# Patient Record
Sex: MALE | Race: WHITE | NOT HISPANIC OR LATINO | Employment: FULL TIME | RURAL
[De-identification: names, ages, dates, MRNs, and addresses within clinical notes are randomized per-mention and may not be internally consistent; named-entity substitution may affect disease eponyms.]

---

## 2023-01-26 ENCOUNTER — OFFICE VISIT (OUTPATIENT)
Dept: FAMILY MEDICINE CLINIC | Facility: CLINIC | Age: 63
End: 2023-01-26
Payer: COMMERCIAL

## 2023-01-26 VITALS
HEIGHT: 71 IN | BODY MASS INDEX: 31.22 KG/M2 | SYSTOLIC BLOOD PRESSURE: 132 MMHG | DIASTOLIC BLOOD PRESSURE: 74 MMHG | HEART RATE: 68 BPM | OXYGEN SATURATION: 98 % | WEIGHT: 223 LBS

## 2023-01-26 DIAGNOSIS — L72.3 SEBACEOUS CYST: Primary | ICD-10-CM

## 2023-01-26 PROCEDURE — 99203 OFFICE O/P NEW LOW 30 MIN: CPT | Performed by: FAMILY MEDICINE

## 2023-01-26 RX ORDER — MINOCYCLINE HYDROCHLORIDE 100 MG/1
100 CAPSULE ORAL 2 TIMES DAILY
Qty: 28 CAPSULE | Refills: 0 | Status: SHIPPED | OUTPATIENT
Start: 2023-01-26

## 2023-01-26 NOTE — PROGRESS NOTES
"Subjective   Sherrie Lima is a 62 y.o. male.     Chief Complaint   Patient presents with   • Establish Care   • Insect Bite     Right shoulder blade        History of Present Illness     sherrie notes a large painful erythematous mass on his right upper back--prsent for several days--denies fever      Current Outpatient Medications:   •  minocycline (Minocin) 100 MG capsule, Take 1 capsule by mouth 2 (Two) Times a Day., Disp: 28 capsule, Rfl: 0  No Known Allergies    BMI is >= 30 and <35. (Class 1 Obesity). The following options were offered after discussion;: nutrition counseling/recommendations      No past medical history on file.  No past surgical history on file.    Review of Systems   Constitutional: Negative.    HENT: Negative.    Eyes: Negative.    Respiratory: Negative.    Cardiovascular: Negative.    Gastrointestinal: Negative.    Endocrine: Negative.    Genitourinary: Negative.    Musculoskeletal: Negative.    Skin: Positive for color change and wound.   Allergic/Immunologic: Negative.    Neurological: Negative.    Hematological: Negative.    Psychiatric/Behavioral: Negative.        Objective  /74   Pulse 68   Ht 180.3 cm (71\")   Wt 101 kg (223 lb)   SpO2 98%   BMI 31.10 kg/m²   Physical Exam  Vitals and nursing note reviewed.   Constitutional:       Appearance: Normal appearance.   HENT:      Head: Normocephalic and atraumatic.      Nose: Nose normal.      Mouth/Throat:      Mouth: Mucous membranes are moist.   Eyes:      Extraocular Movements: Extraocular movements intact.      Conjunctiva/sclera: Conjunctivae normal.      Pupils: Pupils are equal, round, and reactive to light.   Cardiovascular:      Rate and Rhythm: Normal rate and regular rhythm.      Pulses: Normal pulses.      Heart sounds: Normal heart sounds.   Pulmonary:      Effort: Pulmonary effort is normal.   Abdominal:      General: Abdomen is flat. Bowel sounds are normal.      Palpations: Abdomen is soft.   Musculoskeletal:    "      General: Normal range of motion.      Cervical back: Normal range of motion.   Skin:     Capillary Refill: Capillary refill takes less than 2 seconds.      Findings: Erythema and lesion present.   Neurological:      General: No focal deficit present.      Mental Status: He is alert and oriented to person, place, and time. Mental status is at baseline.   Psychiatric:         Mood and Affect: Mood normal.     exam does confirm 4cm by 5cm erythematous mass on the right upper back with central pustule.    Assessment & Plan   Diagnoses and all orders for this visit:    1. Sebaceous cyst (Primary)  -     Ambulatory Referral to General Surgery    Other orders  -     minocycline (Minocin) 100 MG capsule; Take 1 capsule by mouth 2 (Two) Times a Day.  Dispense: 28 capsule; Refill: 0                 Orders Placed This Encounter   Procedures   • Ambulatory Referral to General Surgery     Referral Priority:   Routine     Referral Type:   Consultation     Referral Reason:   Specialty Services Required     Referred to Provider:   Dixon Allen MD     Requested Specialty:   General Surgery     Number of Visits Requested:   1       Follow up: 4 month(s)

## 2023-02-01 ENCOUNTER — HOSPITAL ENCOUNTER (EMERGENCY)
Facility: HOSPITAL | Age: 63
Discharge: HOME | End: 2023-02-01
Attending: STUDENT IN AN ORGANIZED HEALTH CARE EDUCATION/TRAINING PROGRAM | Admitting: STUDENT IN AN ORGANIZED HEALTH CARE EDUCATION/TRAINING PROGRAM
Payer: COMMERCIAL

## 2023-02-01 VITALS
TEMPERATURE: 98.5 F | BODY MASS INDEX: 30.52 KG/M2 | RESPIRATION RATE: 18 BRPM | SYSTOLIC BLOOD PRESSURE: 138 MMHG | WEIGHT: 218 LBS | DIASTOLIC BLOOD PRESSURE: 87 MMHG | OXYGEN SATURATION: 96 % | HEIGHT: 71 IN | HEART RATE: 77 BPM

## 2023-02-01 DIAGNOSIS — L02.212 ABSCESS OF BACK: Primary | ICD-10-CM

## 2023-02-01 DIAGNOSIS — L03.312 CELLULITIS OF BACK EXCEPT BUTTOCK: ICD-10-CM

## 2023-02-01 LAB
ANION GAP SERPL CALCULATED.3IONS-SCNC: 12 MMOL/L (ref 5–15)
BASOPHILS # BLD AUTO: 0.06 10*3/MM3 (ref 0–0.2)
BASOPHILS NFR BLD AUTO: 0.4 % (ref 0–1.5)
BUN SERPL-MCNC: 10 MG/DL (ref 8–23)
BUN/CREAT SERPL: 10 (ref 7–25)
CALCIUM SPEC-SCNC: 8.7 MG/DL (ref 8.6–10.5)
CHLORIDE SERPL-SCNC: 96 MMOL/L (ref 98–107)
CO2 SERPL-SCNC: 28 MMOL/L (ref 22–29)
CREAT SERPL-MCNC: 1 MG/DL (ref 0.76–1.27)
CRP SERPL-MCNC: 11.89 MG/DL (ref 0–0.5)
DEPRECATED RDW RBC AUTO: 42.7 FL (ref 37–54)
EGFRCR SERPLBLD CKD-EPI 2021: 85.1 ML/MIN/1.73
EOSINOPHIL # BLD AUTO: 0.08 10*3/MM3 (ref 0–0.4)
EOSINOPHIL NFR BLD AUTO: 0.6 % (ref 0.3–6.2)
ERYTHROCYTE [DISTWIDTH] IN BLOOD BY AUTOMATED COUNT: 12.6 % (ref 12.3–15.4)
ERYTHROCYTE [SEDIMENTATION RATE] IN BLOOD: 30 MM/HR (ref 0–20)
GLUCOSE SERPL-MCNC: 130 MG/DL (ref 65–99)
HCT VFR BLD AUTO: 46.5 % (ref 37.5–51)
HGB BLD-MCNC: 15.1 G/DL (ref 13–17.7)
IMM GRANULOCYTES # BLD AUTO: 0.07 10*3/MM3 (ref 0–0.05)
IMM GRANULOCYTES NFR BLD AUTO: 0.5 % (ref 0–0.5)
LYMPHOCYTES # BLD AUTO: 2.01 10*3/MM3 (ref 0.7–3.1)
LYMPHOCYTES NFR BLD AUTO: 14.2 % (ref 19.6–45.3)
MCH RBC QN AUTO: 30 PG (ref 26.6–33)
MCHC RBC AUTO-ENTMCNC: 32.5 G/DL (ref 31.5–35.7)
MCV RBC AUTO: 92.3 FL (ref 79–97)
MONOCYTES # BLD AUTO: 1.32 10*3/MM3 (ref 0.1–0.9)
MONOCYTES NFR BLD AUTO: 9.3 % (ref 5–12)
NEUTROPHILS NFR BLD AUTO: 10.59 10*3/MM3 (ref 1.7–7)
NEUTROPHILS NFR BLD AUTO: 75 % (ref 42.7–76)
NRBC BLD AUTO-RTO: 0 /100 WBC (ref 0–0.2)
PLATELET # BLD AUTO: 342 10*3/MM3 (ref 140–450)
PMV BLD AUTO: 9.9 FL (ref 6–12)
POTASSIUM SERPL-SCNC: 4 MMOL/L (ref 3.5–5.2)
RBC # BLD AUTO: 5.04 10*6/MM3 (ref 4.14–5.8)
SODIUM SERPL-SCNC: 136 MMOL/L (ref 136–145)
WBC NRBC COR # BLD: 14.13 10*3/MM3 (ref 3.4–10.8)

## 2023-02-01 PROCEDURE — 85025 COMPLETE CBC W/AUTO DIFF WBC: CPT | Performed by: STUDENT IN AN ORGANIZED HEALTH CARE EDUCATION/TRAINING PROGRAM

## 2023-02-01 PROCEDURE — 86140 C-REACTIVE PROTEIN: CPT | Performed by: STUDENT IN AN ORGANIZED HEALTH CARE EDUCATION/TRAINING PROGRAM

## 2023-02-01 PROCEDURE — 99283 EMERGENCY DEPT VISIT LOW MDM: CPT

## 2023-02-01 PROCEDURE — 85652 RBC SED RATE AUTOMATED: CPT | Performed by: STUDENT IN AN ORGANIZED HEALTH CARE EDUCATION/TRAINING PROGRAM

## 2023-02-01 PROCEDURE — 80048 BASIC METABOLIC PNL TOTAL CA: CPT | Performed by: STUDENT IN AN ORGANIZED HEALTH CARE EDUCATION/TRAINING PROGRAM

## 2023-02-01 RX ORDER — LIDOCAINE HYDROCHLORIDE AND EPINEPHRINE BITARTRATE 20; .01 MG/ML; MG/ML
10 INJECTION, SOLUTION SUBCUTANEOUS ONCE
Status: COMPLETED | OUTPATIENT
Start: 2023-02-01 | End: 2023-02-01

## 2023-02-01 RX ORDER — CEPHALEXIN 500 MG/1
500 CAPSULE ORAL ONCE
Status: COMPLETED | OUTPATIENT
Start: 2023-02-01 | End: 2023-02-01

## 2023-02-01 RX ORDER — OXYCODONE HYDROCHLORIDE 5 MG/1
5 TABLET ORAL ONCE
Status: DISCONTINUED | OUTPATIENT
Start: 2023-02-01 | End: 2023-02-01 | Stop reason: HOSPADM

## 2023-02-01 RX ORDER — ONDANSETRON 4 MG/1
4 TABLET, ORALLY DISINTEGRATING ORAL ONCE
Status: DISCONTINUED | OUTPATIENT
Start: 2023-02-01 | End: 2023-02-01 | Stop reason: HOSPADM

## 2023-02-01 RX ORDER — ACETAMINOPHEN 500 MG
1000 TABLET ORAL ONCE
Status: DISCONTINUED | OUTPATIENT
Start: 2023-02-01 | End: 2023-02-01 | Stop reason: HOSPADM

## 2023-02-01 RX ORDER — IBUPROFEN 400 MG/1
600 TABLET ORAL ONCE
Status: COMPLETED | OUTPATIENT
Start: 2023-02-01 | End: 2023-02-01

## 2023-02-01 RX ORDER — SULFAMETHOXAZOLE AND TRIMETHOPRIM 800; 160 MG/1; MG/1
1 TABLET ORAL 2 TIMES DAILY
Qty: 20 TABLET | Refills: 0 | Status: SHIPPED | OUTPATIENT
Start: 2023-02-01 | End: 2023-02-11

## 2023-02-01 RX ORDER — SULFAMETHOXAZOLE AND TRIMETHOPRIM 800; 160 MG/1; MG/1
1 TABLET ORAL ONCE
Status: COMPLETED | OUTPATIENT
Start: 2023-02-01 | End: 2023-02-01

## 2023-02-01 RX ORDER — CEPHALEXIN 500 MG/1
500 CAPSULE ORAL 3 TIMES DAILY
Qty: 30 CAPSULE | Refills: 0 | Status: SHIPPED | OUTPATIENT
Start: 2023-02-01 | End: 2023-02-11

## 2023-02-01 RX ORDER — SODIUM CHLORIDE 0.9 % (FLUSH) 0.9 %
10 SYRINGE (ML) INJECTION AS NEEDED
Status: DISCONTINUED | OUTPATIENT
Start: 2023-02-01 | End: 2023-02-01 | Stop reason: HOSPADM

## 2023-02-01 RX ADMIN — IBUPROFEN 600 MG: 400 TABLET, FILM COATED ORAL at 20:38

## 2023-02-01 RX ADMIN — CEPHALEXIN 500 MG: 500 CAPSULE ORAL at 19:45

## 2023-02-01 RX ADMIN — LIDOCAINE HYDROCHLORIDE,EPINEPHRINE BITARTRATE 10 ML: 20; .01 INJECTION, SOLUTION INFILTRATION; PERINEURAL at 19:46

## 2023-02-01 RX ADMIN — SULFAMETHOXAZOLE AND TRIMETHOPRIM 1 TABLET: 800; 160 TABLET ORAL at 19:46

## 2023-02-02 NOTE — ED PROCEDURE NOTE
"Place of Service:[facility] EMERGENCY DEPARTMENT  Patient Name:Sherrie Lmia  :1960    Procedure  Incision & Drainage    Date/Time: 2023 10:42 PM  Performed by: Rui Barber MD  Authorized by: Rui Barber MD     Consent:     Consent obtained:  Verbal    Consent given by:  Patient and spouse    Risks discussed:  Bleeding, damage to other organs, infection, incomplete drainage and pain    Alternatives discussed:  Referral  Universal protocol:     Imaging studies available: yes (bedside US)      Patient identity confirmed:  Verbally with patient  Location:     Type:  Abscess    Location:  Trunk    Trunk location:  Back  Pre-procedure details:     Skin preparation:  Chlorhexidine with alcohol  Anesthesia:     Anesthesia method:  Local infiltration    Local anesthetic:  Lidocaine 1% WITH epi  Procedure type:     Complexity:  Simple  Procedure details:     Ultrasound guidance: yes      Needle aspiration: no      Incision types:  Stab incision    Incision depth:  Subcutaneous    Wound management:  Probed and deloculated, irrigated with saline and extensive cleaning    Drainage:  Bloody and purulent    Drainage amount:  Copious    Wound treatment:  Drain placed    Packing materials:  1/2 in iodoform gauze    Amount 1/2\" iodoform:  About 3 inches  Post-procedure details:     Procedure completion:  Tolerated well, no immediate complications              Rui Barber MD  [id number]    "

## 2023-02-02 NOTE — ED PROVIDER NOTES
"EMERGENCY DEPARTMENT ATTENDING NOTE    Patient Name: Sherrie Lima    Chief Complaint   Patient presents with   • Abscess       PATIENT PRESENTATION:  Sherrie Lima is a very pleasant 62 y.o. 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    History is obtained with the patient as well as corroborated by his wife is at the bedside.  About 1 week ago patient noticed some redness and swelling to his right upper back no obvious inciting incident.  States he saw his primary care provider who gave him a prescription for doxycycline for 2 weeks thought it was but possibly a abscess cyst and gave him follow-up with general surgery but his follow-up was not scheduled for about a week.  Patient denies any current fevers any chills.  States it has become more tender to touch and more raised and has had some drainage from it.  Not on any immunosuppressive drugs per his report.  Denies any associate chest pain or shortness of breath.  No associated midline back pain.  No numbness or weakness or tingling in his arms or legs.    PHYSICAL EXAM:   VS: /87 (BP Location: Left arm, Patient Position: Sitting)   Pulse 77   Temp 98.5 °F (36.9 °C) (Oral)   Resp 18   Ht 180.3 cm (71\")   Wt 98.9 kg (218 lb)   SpO2 96%   BMI 30.40 kg/m²   GENERAL: Well-appearing middle-aged man sitting up in stretcher no acute distress well-nourished, well-developed, awake, alert, no acute distress, nontoxic appearing, comfortable  EYES: PERRL, sclerae anicteric, extraocular movements grossly intact, symmetric lids  EARS, NOSE, MOUTH, THROAT: atraumatic external nose and ears, moist mucous membranes  NECK: Symmetric, trachea midline, no thyromegaly, no adenopathy, no meningismus  RESPIRATORY: Unlabored respiratory effort, clear to auscultation bilaterally, good air movement  CARDIOVASCULAR: No murmurs or gallops, peripheral pulses 2+ and equal in all " extremities  GI: Soft, nontender, nondistended, bowel sounds present, no hepatosplenomegaly  LYMPHATIC: no lymphadenopathy  MUSCULOSKELETAL/EXTREMITIES: Extremities without obvious deformity, no cyanosis or clubbing  SKIN: Large subcutaneous abscess with surrounding erythema consistent with cellulitis in the photo below the patient's right upper back; otherwise skin is warm and dry with no obvious rashes    NEUROLOGIC: moving all 4 extremities symmetrically, CN II-XII grossly intact  PSYCHIATRIC: alert, pleasant and cooperative. Appropriate mood and affect.      MEDICAL DECISION MAKING:    Sherrie Lima is a 62 y.o. male who presents emerged part with increased swelling after previous diagnosis of likely abscess.    Differential Diagnosis Considered: Subcutaneous abscess, sebaceous cyst, cellulitis    Labs Ordered:  Labs Reviewed   BASIC METABOLIC PANEL - Abnormal; Notable for the following components:       Result Value    Glucose 130 (*)     Chloride 96 (*)     All other components within normal limits    Narrative:     GFR Normal >60  Chronic Kidney Disease <60  Kidney Failure <15     SEDIMENTATION RATE - Abnormal; Notable for the following components:    Sed Rate 30 (*)     All other components within normal limits   C-REACTIVE PROTEIN - Abnormal; Notable for the following components:    C-Reactive Protein 11.89 (*)     All other components within normal limits   CBC WITH AUTO DIFFERENTIAL - Abnormal; Notable for the following components:    WBC 14.13 (*)     Lymphocyte % 14.2 (*)     Neutrophils, Absolute 10.59 (*)     Monocytes, Absolute 1.32 (*)     Immature Grans, Absolute 0.07 (*)     All other components within normal limits   CBC AND DIFFERENTIAL    Narrative:     The following orders were created for panel order CBC & Differential.  Procedure                               Abnormality         Status                     ---------                               -----------         ------                      CBC Auto Differential[[id number]]         Abnormal            Final result                 Please view results for these tests on the individual orders.        Imaging Ordered: Bedside point care ultrasound performed by me with large subcutaneous abscess seen in the photo below        My lab interpretation: Elevated white blood second of 40.13 elevated ESR to 30 and elevated CRP 11.    ED Course and Re-evaluation: 63yo M presents emerged apartment with increasing size of suspected abscess after previously being seen by his primary care provider.  Purulent drainage on exam.  Bedside ultrasound consistent with subcutaneous abscess.  No systemic symptoms or signs of infection patient afebrile normal vital signs.  Foreign point-of-care ultrasound number for abscess performed a bedside incision drainage with copious amounts of purulent fluid and bloody fluid consistent with abscess.  Patient reporting significant provement symptoms.  His labs are elevated at the setting of cellulitis and abscess but no concerns for systemic infection at this time.  Patient given 1 dose of Bactrim and Keflex in the emergency department and discharged with a 10-day prescription of both.  Plan is for PCP follow-up for wound check.  Packing was placed given large size of abscess.  Patient already has scheduled follow-up with general surgery so was instructed to continue to follow-up as he might need the capsule of the abscess to be ultimately removed.  Given return precautions to the emergency department for worsening symptoms.              ED Diagnosis:  Abscess of back; Cellulitis of back except buttock    Disposition: to home  Follow up plan: PCP follow up within 2 days, general surgery is scheduled next week, return to ED immediately if symptoms worsen          Signed:  Rui Barber MD  Emergency Medicine Physician    Please note that portions of this note were completed with a voice recognition program.      Rui Barber,  MD  02/02/23 0203

## 2023-02-02 NOTE — DISCHARGE INSTRUCTIONS
Today you are seen for symptoms due to a large abscess which we performed incision and drainage.  As we discussed we placed packing please leave in place it falls out this is okay.  I have switched your antibiotic regimen please take the prescribed antibiotics over the next 10 days.  I want you to follow-up with your primary care provider for a wound check but please go to your scheduled general surgery appointment next week as you will possibly need a cyst removal from the wall of the abscess although we had good drainage at the bedside.  If he develop worsening symptoms really high fevers or worsening pain please immediate return to the emergency department.

## 2023-02-07 ENCOUNTER — OFFICE VISIT (OUTPATIENT)
Dept: SURGERY | Facility: CLINIC | Age: 63
End: 2023-02-07
Payer: COMMERCIAL

## 2023-02-07 VITALS
BODY MASS INDEX: 30.52 KG/M2 | OXYGEN SATURATION: 98 % | TEMPERATURE: 97.5 F | SYSTOLIC BLOOD PRESSURE: 154 MMHG | HEIGHT: 71 IN | WEIGHT: 218 LBS | DIASTOLIC BLOOD PRESSURE: 92 MMHG | HEART RATE: 75 BPM

## 2023-02-07 DIAGNOSIS — L72.3 SEBACEOUS CYST: Primary | ICD-10-CM

## 2023-02-07 PROBLEM — L02.212 CUTANEOUS ABSCESS OF BACK EXCLUDING BUTTOCKS: Status: ACTIVE | Noted: 2023-02-07

## 2023-02-07 PROCEDURE — 99203 OFFICE O/P NEW LOW 30 MIN: CPT | Performed by: SPECIALIST

## 2023-02-07 RX ORDER — SODIUM HYPOCHLORITE 2.5 MG/ML
SOLUTION TOPICAL ONCE
Qty: 1000 ML | Refills: 1 | Status: SHIPPED | OUTPATIENT
Start: 2023-02-07 | End: 2023-02-07

## 2023-02-07 NOTE — PATIENT INSTRUCTIONS
Good to see you today Mr. Lima and glad you are feeling better, the abscess cavity looks like it is well treated I am proud how the emergency room treated you, we will continue the dressing changes with Dakin's moistened 4 x 4's daily I will see you back in 2 weeks for repeat evaluation begin the Dakin's moistened changes on Thursday keep the iodoform gauze in there until Thursday.  Follow-up with me in 2 weeks.

## 2023-02-07 NOTE — PROGRESS NOTES
Patient: Sherrie Lima    YOB: 1960    Date: 02/07/2023    Primary Care Provider: Rome Salinas MD    Chief Complaint   Patient presents with   • Cyst     Mr. Lima is here for a consult for a cyst/abscess on his right shoulder.       Subjective .     History of present illness:  Mr. Lima is a 62 y.o. who is here for evaluation of increasingly symptomatic abscess in his right posterior shoulder.  He is noted cyst to be present for some time, and recently he has noted some enlargement of this he was seen and evaluated by Dr. Salinas with it becoming grossly enlarged and subsequently referred to the emergency room for treatment, in the emergency room it was purulent, sizable, with a cyst that was at least 6 x 6 cm in size the area was drained in the emergency room and packed with iodoform gauze.  He is here for follow-up examination.    Past surgical history ventral hernia, also cholecystectomy.    Current medications are Bactrim and also Keflex  Allergies negative    He smokes 1 pack/day for 40 years, occasional alcohol    Review of systems positive for reading glasses, hearing loss, morning cough, a.m. urination, history of skin rash.      The following portions of the patient's history were reviewed and updated as appropriate: allergies, current medications, past family history, past medical history, past social history, past surgical history and problem list.    Review of Systems    History:  History reviewed. No pertinent past medical history.       Past Surgical History:   Procedure Laterality Date   • GALLBLADDER SURGERY     • KNEE SURGERY     • UMBILICAL HERNIA REPAIR         History reviewed. No pertinent family history.    Social History     Tobacco Use   • Smoking status: Every Day     Packs/day: 1.00     Years: 40.00     Pack years: 40.00     Types: Cigarettes   Substance Use Topics   • Drug use: Never       Allergies:  No Known Allergies    Medications:     Current Outpatient  "Medications:   •  cephalexin (KEFLEX) 500 MG capsule, Take 1 capsule by mouth 3 (Three) Times a Day for 10 days., Disp: 30 capsule, Rfl: 0  •  minocycline (Minocin) 100 MG capsule, Take 1 capsule by mouth 2 (Two) Times a Day., Disp: 28 capsule, Rfl: 0  •  sulfamethoxazole-trimethoprim (BACTRIM DS,SEPTRA DS) 800-160 MG per tablet, Take 1 tablet by mouth 2 (Two) Times a Day for 10 days., Disp: 20 tablet, Rfl: 0  •  sodium hypochlorite (DAKIN'S) 0.25 % topical solution, Apply  topically to the appropriate area as directed 1 (One) Time for 1 dose. Dakin's moistened 4 x 4's to pack in the open wound in his upper back daily, Disp: 1000 mL, Rfl: 1    Objective     Vital Signs:   Vitals:    02/07/23 1405   BP: 154/92   BP Location: Left arm   Patient Position: Sitting   Cuff Size: Adult   Pulse: 75   Temp: 97.5 °F (36.4 °C)   SpO2: 98%   Weight: 98.9 kg (218 lb)   Height: 180.3 cm (70.98\")       Physical Exam:     General Appearance:    Alert, cooperative, in no acute distress   Head:    Normocephalic, without obvious abnormality, atraumatic   Eyes:            Lids and lashes normal, conjunctivae and sclerae normal, no   icterus, no pallor, corneas clear,   Ears:    Ears appear intact with no abnormalities noted   Throat:   No oral lesions, no thrush, oral mucosa moist       Lungs:     Clear to auscultation,respirations regular, even and                  Unlabored    Heart:    Regular rhythm and normal rate, no murmur, no gallop.   Chest Wall:    No abnormalities observed   Abdomen:     Normal bowel sounds, no masses, no organomegaly, soft        non-tender, non-distended, no guarding.   Extremities:   Moves all extremities well, no edema, no cyanosis, no             Redness    On his back there is an area on the right upper scapular region in the right upper central back there is an opening that is 1-1/2 cm in size, with white fibrinous ring around this, with some purple change to the overlying skin, packing was removed, " and there was a cavity that sounded out approximately 4-1/2 cm around in this cavity.  The area was cleaned with peroxide the fibrinous rim was excised with a 15 blade, and it was packed with iodoform gauze.  We will change the packing to quarter percent Dakin's moistened solution beginning on Thursday, I will see him back in 2 weeks for repeat evaluation in the future he will need to have this cyst area excised to prevent recurrences if the cyst recurs but at present the abscess cavity seems to be well drained all the sebaceum is out and it is beginning healing.Physical Exam  Skin:                Pulses:   Pulses palpable and equal bilaterally   Skin:   No bleeding, bruising or rash   Lymph nodes:   No palpable adenopathy   Neurologic:   Cranial nerves 2 - 12 grossly intact.         Results Review:   I reviewed the patient's new clinical results.    Review of Systems was reviewed and confirmed as accurate as documented by the MA.    BMI is >= 30 and <35. (Class 1 Obesity). The following options were offered after discussion;: exercise counseling/recommendations  Assessment / Plan:    Diagnoses and all orders for this visit:    1. Sebaceous cyst (Primary)    Other orders  -     sodium hypochlorite (DAKIN'S) 0.25 % topical solution; Apply  topically to the appropriate area as directed 1 (One) Time for 1 dose. Dakin's moistened 4 x 4's to pack in the open wound in his upper back daily  Dispense: 1000 mL; Refill: 1      Patient with a recent infected right upper back epidermal inclusion cyst, there is seems to be adequately treated, good packing and drainage has occurred, cellulitis has resolved he feels much better the systemic signs have resolved.  He has an area approximately 4 cm in this cavity we will packed this with iodoform gauze today and changed to Dakin's moistened material on Thursday, I will see him back in 2 weeks for repeat evaluation, in the future if the cyst recurs in this area I would strongly  suggest him having this area removed to prevent recurrent abscess and sepsis as he had.  Currently sepsis has resolved and abscess is well drained nice job by the emergency room.    Electronically signed by Dixon Allen MD  02/07/23  17:02 CST

## 2023-02-20 NOTE — PROGRESS NOTES
Office Established Patient Note:     Referring Provider: Dr. Rome Jean-Baptiste    Chief complaint follow-up drainage of epidermal inclusion cyst on 7 February 23    Subjective .     History of present illness:    .Mr. Lima is a 62 y.o. who is here for evaluation of increasingly symptomatic abscess in his right posterior shoulder.  He is noted cyst to be present for some time, and recently he has noted some enlargement of this he was seen and evaluated by Dr. Salinas with it becoming grossly enlarged and subsequently referred to the emergency room for treatment, in the emergency room it was purulent, sizable, with a cyst that was at least 6 x 6 cm in size the area was drained in the emergency room and packed with iodoform gauze.  He is here for follow-up examination.      The area that was opened and drained in early February is healing nicely good granulation tissue was noted no further abscess cavity erythema has fully resolved.  2 weeks out from this drainage.  History  No past medical history on file.,   Past Surgical History:   Procedure Laterality Date   • GALLBLADDER SURGERY     • KNEE SURGERY     • UMBILICAL HERNIA REPAIR     , No family history on file.,   Social History     Tobacco Use   • Smoking status: Every Day     Packs/day: 1.00     Years: 40.00     Pack years: 40.00     Types: Cigarettes   Substance Use Topics   • Drug use: Never   , (Not in a hospital admission)   and Allergies:  Patient has no known allergies.    Current Outpatient Medications:   •  minocycline (Minocin) 100 MG capsule, Take 1 capsule by mouth 2 (Two) Times a Day., Disp: 28 capsule, Rfl: 0    Objective     Vital Signs   There were no vitals taken for this visit.     Physical Exam:  Drainage cavity on the right upper outer back is clean and dry good granulation tissue noted, no erythema no drainage, continue at least once a day cleaning with peroxide, and quarter percent Dakin's moistened solution, follow-up with me in 6  weeks    Results Review:  Result Review :  No results found for: FINALDX, GROSSDES      Assessment & Plan     Follow-up wound, status post incision and drainage of a abscess cavity right upper outer back, granulation tissue beginning, no further abscess, no further cellulitis, follow-up with me in 6 weeks for repeat evaluation, sooner if questions or problems arise.    Discussed BMI elevation and need to move toward a reduced BMI for health concerns he appears to understand he is a smoker and his meds were reviewed.      Dixon Allen MD  02/20/23  14:45 CST             no

## 2023-02-21 ENCOUNTER — OFFICE VISIT (OUTPATIENT)
Dept: SURGERY | Facility: CLINIC | Age: 63
End: 2023-02-21
Payer: COMMERCIAL

## 2023-02-21 VITALS
TEMPERATURE: 97 F | WEIGHT: 218.03 LBS | BODY MASS INDEX: 30.52 KG/M2 | HEIGHT: 71 IN | HEART RATE: 74 BPM | DIASTOLIC BLOOD PRESSURE: 99 MMHG | SYSTOLIC BLOOD PRESSURE: 150 MMHG | OXYGEN SATURATION: 99 %

## 2023-02-21 DIAGNOSIS — L72.3 SEBACEOUS CYST: Primary | ICD-10-CM

## 2023-02-21 PROCEDURE — 99212 OFFICE O/P EST SF 10 MIN: CPT | Performed by: SPECIALIST

## 2023-02-21 NOTE — PATIENT INSTRUCTIONS
Thank you for coming today Mr. Lima I think the abscess cavity looks fine, the infection has resolved now it is going to start healing up let me see you back in 6 weeks for repeat evaluation and potential reexcision in 6 to 8 months as it all heals up to prevent further problems.

## 2023-04-04 ENCOUNTER — OFFICE VISIT (OUTPATIENT)
Dept: SURGERY | Facility: CLINIC | Age: 63
End: 2023-04-04
Payer: COMMERCIAL

## 2023-04-04 VITALS
WEIGHT: 218.03 LBS | DIASTOLIC BLOOD PRESSURE: 102 MMHG | OXYGEN SATURATION: 100 % | HEIGHT: 71 IN | HEART RATE: 79 BPM | BODY MASS INDEX: 30.52 KG/M2 | SYSTOLIC BLOOD PRESSURE: 163 MMHG

## 2023-04-04 DIAGNOSIS — L72.3 SEBACEOUS CYST: Primary | ICD-10-CM

## 2023-04-04 DIAGNOSIS — L02.212 CUTANEOUS ABSCESS OF BACK EXCLUDING BUTTOCKS: ICD-10-CM

## 2023-04-04 PROCEDURE — 99212 OFFICE O/P EST SF 10 MIN: CPT | Performed by: SPECIALIST

## 2023-04-04 NOTE — PATIENT INSTRUCTIONS
Thanks for coming today Mr. Lima I believe the abscess is fully healed there is a cyst underlying that that will ultimately need to be removed to prevent you from having recurrences I can get this done at any time see me back in 3 months at the very minimum and we can see about scheduling if he notices cyst or if your wife noticed the cyst becoming bigger come back much before that time and we can see about removing it so it does not get big and infected and draining like it did recently.

## 2023-04-04 NOTE — PROGRESS NOTES
Office Established Patient Note:     Referring Provider: Dr. Salinas    Chief complaint status post incision and drainage of epidermal inclusion cyst 7 February 2023    Subjective .     History of present illness:  Mr. Lima is a 62 y.o. who is here for evaluation of increasingly symptomatic abscess in his right posterior shoulder.  He is noted cyst to be present for some time, and recently he has noted some enlargement of this he was seen and evaluated by Dr. Salinas with it becoming grossly enlarged and subsequently referred to the emergency room for treatment, in the emergency room it was purulent, sizable, with a cyst that was at least 6 x 6 cm in size the area was drained in the emergency room and packed with iodoform gauze.  He is here for follow-up examination.        The area that was opened and drained in early February is healing nicely good granulation tissue was noted no further abscess cavity erythema has fully resolved.  2 weeks out from this drainage.  History      6-week status post incision and drainage of an abscess from the mid back, the area looks to be healing nicely, there is a cyst noted approximately 1 cm in the upper left part of this previous site, I advised him that this will continue and this needs to be removed at some point.  History  No past medical history on file.,   Past Surgical History:   Procedure Laterality Date   • GALLBLADDER SURGERY     • KNEE SURGERY     • UMBILICAL HERNIA REPAIR     , No family history on file.,   Social History     Tobacco Use   • Smoking status: Every Day     Packs/day: 1.00     Years: 40.00     Pack years: 40.00     Types: Cigarettes   Substance Use Topics   • Drug use: Never   , (Not in a hospital admission)   and Allergies:  Patient has no known allergies.    Current Outpatient Medications:   •  minocycline (Minocin) 100 MG capsule, Take 1 capsule by mouth 2 (Two) Times a Day., Disp: 28 capsule, Rfl: 0    Objective     Vital Signs   There were no vitals  taken for this visit.     Physical Exam:  No further infection no further drainage, there is a cyst to the central left of the previous inflammation, not actively draining and the skin is fully healed.  This area needs to be excised and wants to wait until fall to have it removed but I advised he and his wife that if this cyst is becoming bigger and needs to be sooner rather than later.Physical Exam  Skin:                 Results Review:  Result Review :  No results found for: FINALDX, GROSSDES      Assessment & Plan     Follow-up with me in 3 months, sooner if you notice the cyst in the upper back is enlarging, this area needs to be excised and closed to prevent recurrent abscess and drainage.    Discussed BMI elevation and need to move toward a reduced BMI for health concerns he appears to understand he is a smoker and his meds were reviewed.      Dixon Allen MD  04/04/23  13:59 CDT

## 2023-04-14 ENCOUNTER — TELEPHONE (OUTPATIENT)
Dept: FAMILY MEDICINE CLINIC | Facility: CLINIC | Age: 63
End: 2023-04-14

## 2023-04-14 DIAGNOSIS — F41.9 ANXIETY: Primary | ICD-10-CM

## 2023-04-14 RX ORDER — LORAZEPAM 1 MG/1
1 TABLET ORAL DAILY PRN
Qty: 2 TABLET | Refills: 0 | Status: SHIPPED | OUTPATIENT
Start: 2023-04-14

## 2023-04-14 NOTE — TELEPHONE ENCOUNTER
Caller: LOTUS CARLSON    Relationship: Emergency Contact    Best call back number: [phone]    What medication are you requesting: SOMETHING TO HELP WITH PATIENT GOING ON A FLIGHT    What are your current symptoms: PATIENT DOES NOT LIKE FLYING AND IS HAVING TO FLY OUT TO HIS NIECES AND NEEDS TWO PILLS TO HELP WITH HIS ANXIETY (ONE TO GO AND ONE TO COME BACK)    Have you had these symptoms before:    [x] Yes  [] No    Have you been treated for these symptoms before:   [x] Yes  [] No    If a prescription is needed, what is your preferred pharmacy and phone number: [facility] - [place], [place] - [address] [facility] [phone] [facility] [phone] FX     Additional notes: WIFE STATES DR NICHOLAS HAS GIVEN HIM A COUPLE PILLS A FEW YEARS AGO TO BE ABLE TO FLY AND IS WANTING THE SAME THING CALLED IN TO HELP THIS TIME. PLEASE CALL BACK WHEN SOMETHING IS CALLED IN.

## 2023-06-19 DIAGNOSIS — F41.9 ANXIETY: Primary | ICD-10-CM

## 2023-06-19 RX ORDER — ALPRAZOLAM 1 MG/1
TABLET ORAL
Qty: 4 TABLET | Refills: 0 | Status: SHIPPED | OUTPATIENT
Start: 2023-06-19

## 2023-06-19 NOTE — TELEPHONE ENCOUNTER
Caller: LOTUS CARLSON    Relationship: Emergency Contact    Best call back number: [phone]     What medication are you requesting: MEDICATION FOR FLYING    What are your current symptoms: NERVOUS WHEN GETTING ON A PLANE    How long have you been experiencing symptoms: ONLY WHEN GETTING ON A PLANE    Have you had these symptoms before:    [x] Yes  [] No    Have you been treated for these symptoms before:   [x] Yes  [] No    If a prescription is needed, what is your preferred pharmacy and phone number:    [facility] - [place], [place] - [address] [place] [address] [phone] [facility] [phone] FX      Additional notes: DR. PETERSON PRESCRIBED THESE PILLS THE LAST TIME THAT KENDRA WAS GOING TO BE GETTING ON THE PLANE AND IS GOING TO BE FLYING AGAIN  SATURDAY AND NEEDS MEDICATION BEFORE SATURDAY.

## 2023-07-11 PROBLEM — L02.93 CARBUNCLE: Status: ACTIVE | Noted: 2023-07-11

## 2024-04-22 DIAGNOSIS — F41.9 ANXIETY: Primary | ICD-10-CM

## 2024-04-22 RX ORDER — ALPRAZOLAM 1 MG/1
TABLET ORAL
Qty: 4 TABLET | Refills: 0 | Status: SHIPPED | OUTPATIENT
Start: 2024-04-22

## 2024-04-22 NOTE — TELEPHONE ENCOUNTER
Caller: LOTUS CARLSON    Relationship: Emergency Contact    Best call back number: [phone]     What medication are you requesting: XANAX     What are your current symptoms: HELP WITH FLYING ( TRAVELING)    Have you had these symptoms before:    [x] Yes  [] No    Have you been treated for these symptoms before:   [x] Yes  [] No    If a prescription is needed, what is your preferred pharmacy and phone number:  [facility] - [place], [place] - [address] [place] [address] [phone] [facility] [phone] FX      Additional notes:  PATIENT WIFE STATES THAT PCP ALWAYS CALLS IN MEDICATION TO HELP WITH TRAVELING.

## 2025-04-14 ENCOUNTER — HOSPITAL ENCOUNTER (OUTPATIENT)
Dept: GENERAL RADIOLOGY | Facility: HOSPITAL | Age: 65
Discharge: HOME | End: 2025-04-14
Payer: COMMERCIAL

## 2025-04-14 ENCOUNTER — OFFICE VISIT (OUTPATIENT)
Dept: FAMILY MEDICINE CLINIC | Facility: CLINIC | Age: 65
End: 2025-04-14
Payer: COMMERCIAL

## 2025-04-14 VITALS
WEIGHT: 227 LBS | HEIGHT: 71 IN | HEART RATE: 74 BPM | SYSTOLIC BLOOD PRESSURE: 148 MMHG | OXYGEN SATURATION: 97 % | BODY MASS INDEX: 31.78 KG/M2 | DIASTOLIC BLOOD PRESSURE: 90 MMHG

## 2025-04-14 DIAGNOSIS — M25.561 ACUTE PAIN OF RIGHT KNEE: ICD-10-CM

## 2025-04-14 DIAGNOSIS — I10 PRIMARY HYPERTENSION: ICD-10-CM

## 2025-04-14 DIAGNOSIS — M25.561 ACUTE PAIN OF RIGHT KNEE: Primary | ICD-10-CM

## 2025-04-14 PROCEDURE — 99214 OFFICE O/P EST MOD 30 MIN: CPT

## 2025-04-14 PROCEDURE — 73562 X-RAY EXAM OF KNEE 3: CPT

## 2025-04-14 RX ORDER — MELOXICAM 15 MG/1
15 TABLET ORAL DAILY
Qty: 30 TABLET | Refills: 0 | Status: SHIPPED | OUTPATIENT
Start: 2025-04-14

## 2025-04-14 NOTE — PROGRESS NOTES
"Chief Complaint  Pain (Pt having right leg pain, pt has sharp pain in right knee for the past 2 weeks.)    Subjective      Sherrie Lima presents to [facility]  History of Present Illness  The patient is a 64-year-old male who is here today for follow-up of right knee pain and elevated blood pressure.    Two weeks ago, he experienced an incident at work where he exited his truck and subsequently developed progressive discomfort in his right knee. He did not experience a popping sensation during the incident but recalls a similar sensation from a previous meniscus tear. The pain is described as sharp and localized beneath the kneecap, intensifying with each step. Despite the pain, he is able to ascend and descend ladders without exacerbating the condition. He reports no popping or clicking sounds from the knee. His mobility is limited to approximately 4 to 5 steps before needing to pause. He has been managing the pain with Tylenol, which provides insufficient relief, and has also tried Aleve Dual Action and Motrin. He does not use a brace for support. He has been mostly sedentary at home after work, although he managed to mow the lawn yesterday. He reports no locking of the knee. He has been unable to play golf due to the severity of the pain. He has a history of meniscus tear and was previously advised that he was borderline for knee replacement surgery.    His blood pressure today is 148/90, it was 143/96. He has a blood pressure cuff at home.    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    MEDICATIONS  Current: Tylenol, Aleve Dual Action, Motrin      Objective   Vital Signs:  /90   Pulse 74   Ht 180.3 cm (71\")   Wt 103 kg (227 lb)   SpO2 97%   BMI 31.66 kg/m²   Estimated body mass index is 31.66 kg/m² as calculated from the following:    Height as of this encounter: 180.3 cm (71\").    " Weight as of this encounter: 103 kg (227 lb).    Physical Exam     Physical Exam  Lungs were auscultated.  Heart was examined.  Right knee was examined.    Vital Signs  Blood pressure reading is 148/90.      Result Review :  The following labs/imaging/notes were reviewed and discussed with the patient by Juan Gasca MD on 04/14/2025:            Assessment      Diagnoses and all orders for this visit:    1. Acute pain of right knee (Primary)  -     Ambulatory Referral to Physical Therapy for Evaluation & Treatment  -     XR Knee 3 View Right; Future    Other orders  -     meloxicam (MOBIC) 15 MG tablet; Take 1 tablet by mouth Daily.  Dispense: 30 tablet; Refill: 0             Assessment & Plan  1. Right knee pain.  The patient's symptoms do not suggest an anterior cruciate ligament (ACL) injury. A conservative approach will be adopted initially, with escalation as necessary. He is advised to use a patella-sparing brace for stability and to continue with rest, ice, compression, and elevation (RICE) therapy. He is also advised to avoid golfing until further notice. A three-view x-ray of the right knee will be ordered to rule out any fractures or bone-on-bone arthritis. A referral for physical therapy will be made for a duration of 3 to 4 weeks. Meloxicam 15 mg once daily with food will be prescribed, and he is advised to continue taking Tylenol. Topical analgesics such as Voltaren gel or IcyHot may also be used. If there is no improvement after 4 weeks of physical therapy, an MRI will be considered along with orthopedic referral    2. Elevated blood pressure.  His blood pressure readings have consistently been elevated during his visits. He is advised to monitor his blood pressure at home 2 to 3 times per week and bring the list of readings to the next appointment. Lab work will be ordered to assess kidney function, liver function, electrolytes, and cholesterol levels.    Follow-up  The patient will follow up in 4  weeks.    Orders Placed This Encounter   Procedures    XR Knee 3 View Right     Weight bearing     Standing Status:   Future     Expected Date:   4/17/2025     Expiration Date:   7/14/2026     Reason for Exam::   right knee pain.     Release to patient:   Routine Release [[id number]]    Ambulatory Referral to Physical Therapy for Evaluation & Treatment     Referral Priority:   Routine     Referral Type:   Physical Therapy     Referral Reason:   Specialty Services Required     Requested Specialty:   Physical Therapy     Number of Visits Requested:   1       Follow Up   Return in about 4 weeks (around 5/12/2025) for Right knee pain w/ pt referral , lab review,  elevated bp .  Patient was given instructions and counseling regarding his condition or for health maintenance advice. Please see specific information pulled into the AVS if appropriate.         Patient or patient representative verbalized consent for the use of Ambient Listening during the visit with  Juan Gasca MD for chart documentation. 4/14/2025  11:20 CDT

## 2025-04-16 ENCOUNTER — TELEPHONE (OUTPATIENT)
Dept: FAMILY MEDICINE CLINIC | Facility: CLINIC | Age: 65
End: 2025-04-16
Payer: COMMERCIAL

## 2025-04-16 DIAGNOSIS — M54.40 ACUTE LOW BACK PAIN WITH SCIATICA, SCIATICA LATERALITY UNSPECIFIED, UNSPECIFIED BACK PAIN LATERALITY: Primary | ICD-10-CM

## 2025-04-16 RX ORDER — CYCLOBENZAPRINE HCL 5 MG
5 TABLET ORAL 3 TIMES DAILY PRN
Qty: 60 TABLET | Refills: 0 | Status: SHIPPED | OUTPATIENT
Start: 2025-04-16

## 2025-04-16 NOTE — TELEPHONE ENCOUNTER
Pt said that he understands that you want him to do therapy but the pain in his lower back will not allow him to do therapy,he said that there is no way he can wait that long 4-6 weeks for somehting to be done...he has not started therapy yet.

## 2025-04-16 NOTE — TELEPHONE ENCOUNTER
Pt has seen his results on his knee. He is having lower back pain going down into R leg. He thinks it could be his sciatic nerve and is wanting to know if he could get an MRI.

## 2025-04-16 NOTE — TELEPHONE ENCOUNTER
I can order an MRI, But Insurance might not be willing to pay for it prior to physical therapy/conservative measures. I went ahead and put the order in. Just make sure he is aware. Also. Please let the patient know I prescribed a muscle relaxor as well.

## 2025-04-16 NOTE — TELEPHONE ENCOUNTER
AS per my note, lets hold off on MRI for now and see if improves with physical therapy. If no improvement within 4-6 weeks of PT and other conservative measures we can consider MRI.

## 2025-04-30 ENCOUNTER — TELEPHONE (OUTPATIENT)
Dept: FAMILY MEDICINE CLINIC | Facility: CLINIC | Age: 65
End: 2025-04-30

## 2025-04-30 NOTE — TELEPHONE ENCOUNTER
Hub staff attempted to follow warm transfer process and was unsuccessful     Caller: LOTUS CARLSON    Relationship to patient: Emergency Contact    Best call back number:     [phone]        Patient is needing: HE NEEDS TO RS HIS UPCOMING LAB APPT.         ”

## 2025-05-05 ENCOUNTER — TELEPHONE (OUTPATIENT)
Dept: FAMILY MEDICINE CLINIC | Facility: CLINIC | Age: 65
End: 2025-05-05
Payer: COMMERCIAL

## 2025-05-05 DIAGNOSIS — M54.40 ACUTE LOW BACK PAIN WITH SCIATICA, SCIATICA LATERALITY UNSPECIFIED, UNSPECIFIED BACK PAIN LATERALITY: ICD-10-CM

## 2025-05-05 NOTE — TELEPHONE ENCOUNTER
Marquis called from [facility] and said the pts MRI needed to be authorized by [facility].

## 2025-05-07 ENCOUNTER — TELEPHONE (OUTPATIENT)
Dept: FAMILY MEDICINE CLINIC | Facility: CLINIC | Age: 65
End: 2025-05-07
Payer: COMMERCIAL

## 2025-05-07 DIAGNOSIS — M54.40 ACUTE LOW BACK PAIN WITH SCIATICA, SCIATICA LATERALITY UNSPECIFIED, UNSPECIFIED BACK PAIN LATERALITY: Primary | ICD-10-CM

## 2025-05-07 LAB
ALBUMIN SERPL-MCNC: 4.1 G/DL (ref 3.5–5.2)
ALBUMIN/GLOB SERPL: 1.3 G/DL
ALP SERPL-CCNC: 116 U/L (ref 39–117)
ALT SERPL-CCNC: 15 U/L (ref 1–41)
AST SERPL-CCNC: 16 U/L (ref 1–40)
BASOPHILS # BLD AUTO: 0.05 10*3/MM3 (ref 0–0.2)
BASOPHILS NFR BLD AUTO: 0.7 % (ref 0–1.5)
BILIRUB SERPL-MCNC: 0.5 MG/DL (ref 0–1.2)
BUN SERPL-MCNC: 8 MG/DL (ref 8–23)
BUN/CREAT SERPL: 7 (ref 7–25)
CALCIUM SERPL-MCNC: 9 MG/DL (ref 8.6–10.5)
CHLORIDE SERPL-SCNC: 97 MMOL/L (ref 98–107)
CHOLEST SERPL-MCNC: 140 MG/DL (ref 0–200)
CO2 SERPL-SCNC: 28.3 MMOL/L (ref 22–29)
CREAT SERPL-MCNC: 1.14 MG/DL (ref 0.76–1.27)
EGFRCR SERPLBLD CKD-EPI 2021: 71.4 ML/MIN/1.73
EOSINOPHIL # BLD AUTO: 0.23 10*3/MM3 (ref 0–0.4)
EOSINOPHIL NFR BLD AUTO: 3.2 % (ref 0.3–6.2)
ERYTHROCYTE [DISTWIDTH] IN BLOOD BY AUTOMATED COUNT: 12.7 % (ref 12.3–15.4)
FOLATE SERPL-MCNC: 5.26 NG/ML (ref 4.78–24.2)
GLOBULIN SER CALC-MCNC: 3.1 GM/DL
GLUCOSE SERPL-MCNC: 117 MG/DL (ref 65–99)
HCT VFR BLD AUTO: 47.4 % (ref 37.5–51)
HDLC SERPL-MCNC: 34 MG/DL (ref 40–60)
HGB BLD-MCNC: 16.1 G/DL (ref 13–17.7)
IMM GRANULOCYTES # BLD AUTO: 0.02 10*3/MM3 (ref 0–0.05)
IMM GRANULOCYTES NFR BLD AUTO: 0.3 % (ref 0–0.5)
LDLC SERPL CALC-MCNC: 84 MG/DL (ref 0–100)
LYMPHOCYTES # BLD AUTO: 1.81 10*3/MM3 (ref 0.7–3.1)
LYMPHOCYTES NFR BLD AUTO: 25.4 % (ref 19.6–45.3)
MAGNESIUM SERPL-MCNC: 1.8 MG/DL (ref 1.6–2.4)
MCH RBC QN AUTO: 31.5 PG (ref 26.6–33)
MCHC RBC AUTO-ENTMCNC: 34 G/DL (ref 31.5–35.7)
MCV RBC AUTO: 92.8 FL (ref 79–97)
MONOCYTES # BLD AUTO: 0.56 10*3/MM3 (ref 0.1–0.9)
MONOCYTES NFR BLD AUTO: 7.8 % (ref 5–12)
NEUTROPHILS # BLD AUTO: 4.47 10*3/MM3 (ref 1.7–7)
NEUTROPHILS NFR BLD AUTO: 62.6 % (ref 42.7–76)
NRBC BLD AUTO-RTO: 0 /100 WBC (ref 0–0.2)
PLATELET # BLD AUTO: 225 10*3/MM3 (ref 140–450)
POTASSIUM SERPL-SCNC: 4.8 MMOL/L (ref 3.5–5.2)
PROT SERPL-MCNC: 7.2 G/DL (ref 6–8.5)
RBC # BLD AUTO: 5.11 10*6/MM3 (ref 4.14–5.8)
SODIUM SERPL-SCNC: 135 MMOL/L (ref 136–145)
TRIGL SERPL-MCNC: 119 MG/DL (ref 0–150)
VIT B12 SERPL-MCNC: 294 PG/ML (ref 211–946)
VLDLC SERPL CALC-MCNC: 22 MG/DL (ref 5–40)
WBC # BLD AUTO: 7.14 10*3/MM3 (ref 3.4–10.8)

## 2025-05-07 NOTE — TELEPHONE ENCOUNTER
I only see therapy order for the knee but a mri was ordered on the back no results yet,so do you want to order this or wait for the results?

## 2025-05-07 NOTE — TELEPHONE ENCOUNTER
CANDI marcano called stating that they are needing physical therapy order dx for pt to be able to have his back checked. Please advise

## 2025-05-13 ENCOUNTER — OFFICE VISIT (OUTPATIENT)
Dept: FAMILY MEDICINE CLINIC | Facility: CLINIC | Age: 65
End: 2025-05-13
Payer: COMMERCIAL

## 2025-05-13 VITALS
HEIGHT: 71 IN | BODY MASS INDEX: 31.44 KG/M2 | SYSTOLIC BLOOD PRESSURE: 130 MMHG | WEIGHT: 224.6 LBS | DIASTOLIC BLOOD PRESSURE: 80 MMHG

## 2025-05-13 DIAGNOSIS — I10 PRIMARY HYPERTENSION: Primary | ICD-10-CM

## 2025-05-13 DIAGNOSIS — M25.561 ACUTE PAIN OF RIGHT KNEE: ICD-10-CM

## 2025-05-13 PROCEDURE — 99214 OFFICE O/P EST MOD 30 MIN: CPT

## 2025-05-13 RX ORDER — LOSARTAN POTASSIUM 25 MG/1
25 TABLET ORAL DAILY
Qty: 30 TABLET | Refills: 1 | Status: SHIPPED | OUTPATIENT
Start: 2025-05-13

## 2025-05-13 RX ORDER — ACETAMINOPHEN 160 MG/5ML
15 SOLUTION ORAL EVERY 4 HOURS PRN
COMMUNITY

## 2025-05-13 NOTE — PROGRESS NOTES
"Chief Complaint  Knee Pain (Right knee-most of the pain gone,tingling on the inside,about 5 weeks)    Subjective      Sherrie Lima presents to [facility]  History of Present Illness  The patient is a 65-year-old male who presents today for right knee pain and hypertension.    He reports an improvement in his right knee pain, with occasional tingling sensations. He has been engaging in physical therapy and has discontinued the MRI due to the alleviation of his symptoms. He also mentions that he has recently retired, which may have contributed to the improvement. His previous occupation involved walking a quarter of a mile 10 times daily, a task he found challenging but notes that his condition improved upon cessation.    His wife has provided a record of his blood pressure readings, which have been consistently elevated. He has not previously been on antihypertensive medication. He inquires about the potential interaction between losartan and Tylenol PM, as he currently takes 2 tablets of Tylenol PM for sleep.      Objective   Vital Signs:  /80   Ht 180.3 cm (70.98\")   Wt 102 kg (224 lb 9.6 oz)   BMI 31.34 kg/m²   Estimated body mass index is 31.34 kg/m² as calculated from the following:    Height as of this encounter: 180.3 cm (70.98\").    Weight as of this encounter: 102 kg (224 lb 9.6 oz).            Physical Exam  Constitutional:       General: He is not in acute distress.     Appearance: He is not ill-appearing.   Cardiovascular:      Rate and Rhythm: Normal rate and regular rhythm.      Heart sounds: Normal heart sounds. No murmur heard.     No friction rub. No gallop.   Pulmonary:      Effort: Pulmonary effort is normal. No respiratory distress.      Breath sounds: Normal breath sounds. No wheezing, rhonchi or rales.   Abdominal:      General: Abdomen is flat. Bowel sounds are normal. There is no distension.      Tenderness: There is no abdominal tenderness. "   Skin:     General: Skin is warm and dry.          Physical Exam        Result Review :  The following labs/imaging/notes were reviewed and discussed with the patient by Juan Gasca MD on 05/13/2025:   Latest Reference Range & Units 05/06/25 07:09   Sodium 136 - 145 mmol/L 135 (L)   Potassium 3.5 - 5.2 mmol/L 4.8   Chloride 98 - 107 mmol/L 97 (L)   CO2 22.0 - 29.0 mmol/L 28.3   BUN 8 - 23 mg/dL 8   Creatinine 0.76 - 1.27 mg/dL 1.14   BUN/Creatinine Ratio 7.0 - 25.0  7.0   EGFR Result >60.0 mL/min/1.73 71.4   Glucose 65 - 99 mg/dL 117 (H)   Calcium 8.6 - 10.5 mg/dL 9.0   Magnesium 1.6 - 2.4 mg/dL 1.8   Alkaline Phosphatase 39 - 117 U/L 116   Total Protein 6.0 - 8.5 g/dL 7.2   Albumin 3.5 - 5.2 g/dL 4.1   A/G Ratio g/dL 1.3   AST (SGOT) 1 - 40 U/L 16   ALT (SGPT) 1 - 41 U/L 15   Total Bilirubin 0.0 - 1.2 mg/dL 0.5   Vitamin B-12 211 - 946 pg/mL 294   Folate 4.78 - 24.20 ng/mL 5.26   Total Cholesterol 0 - 200 mg/dL 140   HDL Cholesterol 40 - 60 mg/dL 34 (L)   LDL Cholesterol  0 - 100 mg/dL 84   Triglycerides 0 - 150 mg/dL 119   VLDL Cholesterol Franklyn 5 - 40 mg/dL 22   Globulin gm/dL 3.1   WBC 3.40 - 10.80 10*3/mm3 7.14   RBC 4.14 - 5.80 10*6/mm3 5.11   Hemoglobin 13.0 - 17.7 g/dL 16.1   Hematocrit 37.5 - 51.0 % 47.4   Platelets 140 - 450 10*3/mm3 225   RDW 12.3 - 15.4 % 12.7   MCV 79.0 - 97.0 fL 92.8   MCH 26.6 - 33.0 pg 31.5   MCHC 31.5 - 35.7 g/dL 34.0   Neutrophil Rel % 42.7 - 76.0 % 62.6   Lymphocyte Rel % 19.6 - 45.3 % 25.4   Monocyte Rel % 5.0 - 12.0 % 7.8   Eosinophil Rel % 0.3 - 6.2 % 3.2   Basophil Rel % 0.0 - 1.5 % 0.7   Immature Granulocyte Rel % 0.0 - 0.5 % 0.3   Neutrophils Absolute 1.70 - 7.00 10*3/mm3 4.47   Lymphocytes Absolute 0.70 - 3.10 10*3/mm3 1.81   Monocytes Absolute 0.10 - 0.90 10*3/mm3 0.56   Eosinophils Absolute 0.00 - 0.40 10*3/mm3 0.23   Basophils Absolute 0.00 - 0.20 10*3/mm3 0.05   Immature Grans, Absolute 0.00 - 0.05 10*3/mm3 0.02   nRBC 0.0 - 0.2 /100 WBC 0.0   (L): Data is abnormally  low  (H): Data is abnormally high          Assessment      Diagnoses and all orders for this visit:    1. Primary hypertension (Primary)    2. Acute pain of right knee    Other orders  -     losartan (Cozaar) 25 MG tablet; Take 1 tablet by mouth Daily.  Dispense: 30 tablet; Refill: 1             Assessment & Plan  1. Right knee pain.  - The patient's right knee pain has improved significantly.  - Occasional tingling is reported, but overall progress is good.  - Physical therapy has been completed, and the MRI was canceled due to symptom improvement.  - Advised to continue monitoring symptoms and avoid excessive strain on the knee.    2. Hypertension.  - Blood pressure remains elevated.  - Initiated on losartan 25 mg, to be taken once daily at a consistent time.  - Advised to monitor blood pressure 3 to 4 times per week while in a relaxed state.  - Follow-up message via [employer] expected in approximately 1 month to assess response to medication. If blood pressure remains high after 1 to 1.5 months, the dosage of losartan will be increased to 50 mg. A lab draw will be conducted at the 2-month jimenez to evaluate renal function and electrolyte levels. Instructed to bring home blood pressure cuff to the next visit for calibration against clinic's equipment. If necessary, a combination pill may be considered.    No orders of the defined types were placed in this encounter.      Follow Up   Return in about 2 months (around 7/13/2025) for New HTN w/ new med. .  Patient was given instructions and counseling regarding his condition or for health maintenance advice. Please see specific information pulled into the AVS if appropriate.         Patient or patient representative verbalized consent for the use of Ambient Listening during the visit with  Juan Gasca MD for chart documentation. 5/13/2025  11:01 CDT

## 2025-07-22 ENCOUNTER — OFFICE VISIT (OUTPATIENT)
Dept: FAMILY MEDICINE CLINIC | Facility: CLINIC | Age: 65
End: 2025-07-22
Payer: MEDICARE

## 2025-07-22 VITALS
WEIGHT: 227 LBS | SYSTOLIC BLOOD PRESSURE: 120 MMHG | OXYGEN SATURATION: 96 % | HEIGHT: 70 IN | HEART RATE: 68 BPM | BODY MASS INDEX: 32.5 KG/M2 | DIASTOLIC BLOOD PRESSURE: 64 MMHG

## 2025-07-22 DIAGNOSIS — I10 PRIMARY HYPERTENSION: Primary | ICD-10-CM

## 2025-07-22 DIAGNOSIS — Z12.2 ENCOUNTER FOR SCREENING FOR LUNG CANCER: ICD-10-CM

## 2025-07-22 DIAGNOSIS — Z87.891 PERSONAL HISTORY OF NICOTINE DEPENDENCE: ICD-10-CM

## 2025-07-22 DIAGNOSIS — Z12.11 SCREENING FOR COLON CANCER: ICD-10-CM

## 2025-07-22 RX ORDER — LOSARTAN POTASSIUM 25 MG/1
25 TABLET ORAL DAILY
Qty: 90 TABLET | Refills: 1 | Status: SHIPPED | OUTPATIENT
Start: 2025-07-22

## 2025-07-22 RX ORDER — LOSARTAN POTASSIUM 25 MG/1
25 TABLET ORAL DAILY
Qty: 30 TABLET | Refills: 1 | Status: SHIPPED | OUTPATIENT
Start: 2025-07-22 | End: 2025-07-22 | Stop reason: SDUPTHER

## 2025-07-22 NOTE — PROGRESS NOTES
"Chief Complaint  Hypertension    Subjective      Sherrie Lima presents to [facility]  History of Present Illness  The patient is a 65-year-old male who is here today for follow-up on hypertension with new medications.    He reports that his blood pressure has been stable at home, averaging around 130/70. Today's reading was 120/60. He missed his dose of losartan 25 mg last night due to running out of the medication. He experienced some fatigue in the initial days of starting the medication but has since adjusted.    He has not undergone a lung cancer screening or a colonoscopy. He is interested in doing Cologuard.    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 He has not seen Dr. Burt since 2023 due to insurance issues. He was previously under the care of Dr. Burt, whom he saw quarterly. His treatment included a topical ointment and PUVA treatments, of which he had about 274 sessions. He also has lymphomatoid papulosis. He plans to schedule an appointment with Dr. Burt again in [place] at [facility]. He occasionally experiences itching.    Hobbies: Golf  Diet: He does not consume salt.  Tobacco: He smokes cigarettes, more than one pack per day since he was 15.      Objective   Vital Signs:  /64   Pulse 68   Ht 177.8 cm (70\")   Wt 103 kg (227 lb)   SpO2 96%   BMI 32.57 kg/m²   Estimated body mass index is 32.57 kg/m² as calculated from the following:    Height as of this encounter: 177.8 cm (70\").    Weight as of this encounter: 103 kg (227 lb).            Physical Exam     Physical Exam        Result Review :  The following labs/imaging/notes were reviewed and discussed with the patient by Juan Gasca MD on 07/22/2025:   Latest Reference Range & Units 05/06/25 07:09   Sodium 136 - 145 mmol/L 135 (L)   Potassium 3.5 - 5.2 mmol/L 4.8   Chloride 98 - 107 mmol/L 97 (L)   CO2 22.0 - 29.0 mmol/L 28.3   BUN 8 - 23 mg/dL 8   Creatinine " 0.76 - 1.27 mg/dL 1.14   BUN/Creatinine Ratio 7.0 - 25.0  7.0   EGFR Result >60.0 mL/min/1.73 71.4   Glucose 65 - 99 mg/dL 117 (H)   Calcium 8.6 - 10.5 mg/dL 9.0   Magnesium 1.6 - 2.4 mg/dL 1.8   Alkaline Phosphatase 39 - 117 U/L 116   Total Protein 6.0 - 8.5 g/dL 7.2   Albumin 3.5 - 5.2 g/dL 4.1   A/G Ratio g/dL 1.3   AST (SGOT) 1 - 40 U/L 16   ALT (SGPT) 1 - 41 U/L 15   Total Bilirubin 0.0 - 1.2 mg/dL 0.5   Vitamin B-12 211 - 946 pg/mL 294   Folate 4.78 - 24.20 ng/mL 5.26   Total Cholesterol 0 - 200 mg/dL 140   HDL Cholesterol 40 - 60 mg/dL 34 (L)   LDL Cholesterol  0 - 100 mg/dL 84   Triglycerides 0 - 150 mg/dL 119   VLDL Cholesterol [place] 5 - 40 mg/dL 22   Globulin gm/dL 3.1   WBC 3.40 - 10.80 10*3/mm3 7.14   RBC 4.14 - 5.80 10*6/mm3 5.11   Hemoglobin 13.0 - 17.7 g/dL 16.1   Hematocrit 37.5 - 51.0 % 47.4   Platelets 140 - 450 10*3/mm3 225   RDW 12.3 - 15.4 % 12.7   MCV 79.0 - 97.0 fL 92.8   MCH 26.6 - 33.0 pg 31.5   MCHC 31.5 - 35.7 g/dL 34.0   Neutrophil Rel % 42.7 - 76.0 % 62.6   Lymphocyte Rel % 19.6 - 45.3 % 25.4   Monocyte Rel % 5.0 - 12.0 % 7.8   Eosinophil Rel % 0.3 - 6.2 % 3.2   Basophil Rel % 0.0 - 1.5 % 0.7   Immature Granulocyte Rel % 0.0 - 0.5 % 0.3   Neutrophils Absolute 1.70 - 7.00 10*3/mm3 4.47   Lymphocytes Absolute 0.70 - 3.10 10*3/mm3 1.81   Monocytes Absolute 0.10 - 0.90 10*3/mm3 0.56   Eosinophils Absolute 0.00 - 0.40 10*3/mm3 0.23   Basophils Absolute 0.00 - 0.20 10*3/mm3 0.05   Immature Grans, Absolute 0.00 - 0.05 10*3/mm3 0.02   nRBC 0.0 - 0.2 /100 WBC 0.0   (L): Data is abnormally low  (H): Data is abnormally high          Assessment      Diagnoses and all orders for this visit:    1. Primary hypertension (Primary)  -     Comprehensive Metabolic Panel    2. Screening for colon cancer  -     Cologuard - Stool, Per Rectum; Future    3. Encounter for screening for lung cancer  -      CT Chest Low Dose Cancer Screening WO; Future    4. Personal history of nicotine dependence  -      CT Chest  Low Dose Cancer Screening WO; Future    Other orders  -     losartan (COZAAR) 25 MG tablet; Take 1 tablet by mouth Daily.  Dispense: 90 tablet; Refill: 1             Assessment & Plan  1. Hypertension.  - Blood pressure readings have been satisfactory, with a recent reading of 130/70 at home and 120/60 in the office today.  - Missed dose of losartan 25 mg last night due to running out of the medication.  - Comprehensive metabolic panel (CMP) will be ordered to monitor kidney function and electrolyte levels while on losartan.  - A 90-day supply of losartan 25 mg will be provided.    2. Cutaneous T-cell lymphoma.  - History of cutaneous T-cell lymphoma diagnosed at age 40.  - Has not seen his specialist, Dr. Burt, for the past 2 years due to insurance issues.  - Advised to schedule an appointment with Dr. Burt at [place] for follow-up.  - If a referral is needed, he should inform the office.    3. Health maintenance.  - Up to date on all screening tests except for colonoscopy and lung cancer screening.  - Low-dose CT scan for lung cancer screening will be scheduled due to smoking history.  - Cologuard test will be ordered for colon cancer screening.    Follow-up: The patient will follow up in 6 months for a Medicare wellness visit.    Orders Placed This Encounter   Procedures     CT Chest Low Dose Cancer Screening WO     Standing Status:   Future     Expected Date:   7/27/2025     Expiration Date:   7/22/2026     The patient is age 50-80 (Medicare coverage 50-77):   65     The patient is a current smoker?:   Yes     Does the patient have a smoking history of 20 pack-years or greater? (If the answer to this is no they do not meet criteria for this exam):   Yes     Actual pack - year smoking history (number)::   40     Does the patient have any clinical signs/symptoms of lung cancer?:   No     The patient was engaged in shared decision-making for this test::   Yes     Release to patient:   Routine Release  [[id number]]    Cologuard - Stool, Per Rectum     Standing Status:   Future     Number of Occurrences:   1     Expected Date:   7/22/2025     Expiration Date:   7/22/2026     Release to patient:   Routine Release [[id number]]    Comprehensive Metabolic Panel     Release to patient:   Routine Release [[id number]]       Follow Up   Return in about 6 months (around 1/22/2026) for Medicare Wellness, HTN.  Patient was given instructions and counseling regarding his condition or for health maintenance advice. Please see specific information pulled into the AVS if appropriate.         Patient or patient representative verbalized consent for the use of Ambient Listening during the visit with  Juan Gasca MD for chart documentation. 7/27/2025  16:38 CDT

## 2025-07-23 LAB
ALBUMIN SERPL-MCNC: 4.5 G/DL (ref 3.5–5.2)
ALBUMIN/GLOB SERPL: 1.5 G/DL
ALP SERPL-CCNC: 114 U/L (ref 39–117)
ALT SERPL-CCNC: 14 U/L (ref 1–41)
AST SERPL-CCNC: 17 U/L (ref 1–40)
BILIRUB SERPL-MCNC: 0.4 MG/DL (ref 0–1.2)
BUN SERPL-MCNC: 11 MG/DL (ref 8–23)
BUN/CREAT SERPL: 7.4 (ref 7–25)
CALCIUM SERPL-MCNC: 9.1 MG/DL (ref 8.6–10.5)
CHLORIDE SERPL-SCNC: 102 MMOL/L (ref 98–107)
CO2 SERPL-SCNC: 23.6 MMOL/L (ref 22–29)
CREAT SERPL-MCNC: 1.49 MG/DL (ref 0.76–1.27)
EGFRCR SERPLBLD CKD-EPI 2021: 51.8 ML/MIN/1.73
GLOBULIN SER CALC-MCNC: 3.1 GM/DL
GLUCOSE SERPL-MCNC: 96 MG/DL (ref 65–99)
POTASSIUM SERPL-SCNC: 4.1 MMOL/L (ref 3.5–5.2)
PROT SERPL-MCNC: 7.6 G/DL (ref 6–8.5)
SODIUM SERPL-SCNC: 139 MMOL/L (ref 136–145)

## 2025-08-11 ENCOUNTER — HOSPITAL ENCOUNTER (OUTPATIENT)
Dept: CT IMAGING | Facility: HOSPITAL | Age: 65
Discharge: HOME | End: 2025-08-11
Payer: MEDICARE

## 2025-08-11 DIAGNOSIS — Z87.891 PERSONAL HISTORY OF NICOTINE DEPENDENCE: ICD-10-CM

## 2025-08-11 DIAGNOSIS — Z12.2 ENCOUNTER FOR SCREENING FOR LUNG CANCER: ICD-10-CM

## 2025-08-11 PROCEDURE — 71271 CT THORAX LUNG CANCER SCR C-: CPT

## 2025-08-20 RX ORDER — LOSARTAN POTASSIUM 25 MG/1
25 TABLET ORAL DAILY
Qty: 30 TABLET | Refills: 1 | Status: SHIPPED | OUTPATIENT
Start: 2025-08-20